# Patient Record
Sex: FEMALE | Race: WHITE | URBAN - METROPOLITAN AREA
[De-identification: names, ages, dates, MRNs, and addresses within clinical notes are randomized per-mention and may not be internally consistent; named-entity substitution may affect disease eponyms.]

---

## 2021-04-07 ENCOUNTER — OFFICE VISIT (OUTPATIENT)
Dept: FAMILY MEDICINE CLINIC | Age: 56
End: 2021-04-07
Payer: COMMERCIAL

## 2021-04-07 VITALS
WEIGHT: 153 LBS | HEIGHT: 65 IN | DIASTOLIC BLOOD PRESSURE: 76 MMHG | SYSTOLIC BLOOD PRESSURE: 118 MMHG | TEMPERATURE: 97.4 F | OXYGEN SATURATION: 98 % | HEART RATE: 83 BPM | BODY MASS INDEX: 25.49 KG/M2

## 2021-04-07 DIAGNOSIS — B35.4 RINGWORM OF BODY: Primary | ICD-10-CM

## 2021-04-07 DIAGNOSIS — L30.9 DERMATITIS: ICD-10-CM

## 2021-04-07 PROCEDURE — 99213 OFFICE O/P EST LOW 20 MIN: CPT | Performed by: PHYSICIAN ASSISTANT

## 2021-04-07 RX ORDER — CLOTRIMAZOLE 1 %
CREAM (GRAM) TOPICAL
Qty: 1 TUBE | Refills: 1 | Status: SHIPPED | OUTPATIENT
Start: 2021-04-07 | End: 2021-04-14

## 2021-04-07 RX ORDER — CEPHALEXIN 500 MG/1
500 CAPSULE ORAL 2 TIMES DAILY
Qty: 14 CAPSULE | Refills: 0 | Status: SHIPPED | OUTPATIENT
Start: 2021-04-07 | End: 2021-04-14

## 2021-04-07 RX ORDER — FLUCONAZOLE 100 MG/1
100 TABLET ORAL DAILY
Qty: 3 TABLET | Refills: 0 | Status: SHIPPED | OUTPATIENT
Start: 2021-04-07 | End: 2021-04-10

## 2021-04-07 SDOH — ECONOMIC STABILITY: TRANSPORTATION INSECURITY
IN THE PAST 12 MONTHS, HAS LACK OF TRANSPORTATION KEPT YOU FROM MEETINGS, WORK, OR FROM GETTING THINGS NEEDED FOR DAILY LIVING?: NO

## 2021-04-07 SDOH — ECONOMIC STABILITY: FOOD INSECURITY: WITHIN THE PAST 12 MONTHS, THE FOOD YOU BOUGHT JUST DIDN'T LAST AND YOU DIDN'T HAVE MONEY TO GET MORE.: NEVER TRUE

## 2021-04-07 SDOH — ECONOMIC STABILITY: FOOD INSECURITY: WITHIN THE PAST 12 MONTHS, YOU WORRIED THAT YOUR FOOD WOULD RUN OUT BEFORE YOU GOT MONEY TO BUY MORE.: NEVER TRUE

## 2021-04-07 ASSESSMENT — ENCOUNTER SYMPTOMS
VOMITING: 0
SINUS PRESSURE: 0
BACK PAIN: 0
SHORTNESS OF BREATH: 0
COUGH: 0
ABDOMINAL PAIN: 0
DIARRHEA: 0
CHEST TIGHTNESS: 0
TROUBLE SWALLOWING: 0

## 2021-04-07 ASSESSMENT — PATIENT HEALTH QUESTIONNAIRE - PHQ9
SUM OF ALL RESPONSES TO PHQ QUESTIONS 1-9: 0
2. FEELING DOWN, DEPRESSED OR HOPELESS: 0
1. LITTLE INTEREST OR PLEASURE IN DOING THINGS: 0
SUM OF ALL RESPONSES TO PHQ QUESTIONS 1-9: 0
SUM OF ALL RESPONSES TO PHQ9 QUESTIONS 1 & 2: 0

## 2021-04-07 NOTE — PROGRESS NOTES
Subjective:      Patient ID: Erick Gamez is a 50 y.o. female who presents today for:  Chief Complaint   Patient presents with    Other     Patient here witha spot on arm started this week        HPI  50year old female who presents with a round spot on her right forearm. She states that she has had it four days. It is a round circular lesion with a clear spot in the center. She cannot remember exactly when she got it. Past Medical History:   Diagnosis Date    Allergic     Hypothyroidism     Sinus disorder      Past Surgical History:   Procedure Laterality Date    CHOLECYSTECTOMY      COLPOSCOPY      DILATION AND CURETTAGE      HYSTEROSCOPY      LEEP      SINUS SURGERY       Social History     Socioeconomic History    Marital status: Single     Spouse name: Not on file    Number of children: Not on file    Years of education: Not on file    Highest education level: Not on file   Occupational History    Not on file   Social Needs    Financial resource strain: Not hard at all    Food insecurity     Worry: Never true     Inability: Never true   [employer] needs     Medical: No     Non-medical: No   Tobacco Use    Smoking status: Never Smoker    Smokeless tobacco: Never Used   Substance and Sexual Activity    Alcohol use:  Yes     Alcohol/week: 2.0 standard drinks     Types: 2 Standard drinks or equivalent per week     Frequency: 2-3 times a week     Drinks per session: 1 or 2     Binge frequency: Never     Comment: occ    Drug use: No    Sexual activity: Not Currently     Partners: Male   Lifestyle    Physical activity     Days per week: Not on file     Minutes per session: Not on file    Stress: Not on file   Relationships    Social connections     Talks on phone: Not on file     Gets together: Not on file     Attends Latter day service: Not on file     Active member of club or organization: Not on file     Attends meetings of clubs or organizations: Not on file     Relationship status: Not on file    Intimate partner violence     Fear of current or ex partner: Not on file     Emotionally abused: Not on file     Physically abused: Not on file     Forced sexual activity: Not on file   Other Topics Concern    Not on file   Social History Narrative    Not on file     Family History   Problem Relation Age of Onset    Cancer Father     Hypertension Mother     Breast Cancer Mother     Breast Cancer Maternal Aunt     Breast Cancer Paternal Grandmother      Allergies   Allergen Reactions    Sulfa Antibiotics Anaphylaxis     Current Outpatient Medications   Medication Sig Dispense Refill    fluconazole (DIFLUCAN) 100 MG tablet Take 1 tablet by mouth daily for 3 days 3 tablet 0    clotrimazole (LOTRIMIN AF) 1 % cream Apply topically 2 times daily. 1 Tube 1    cephALEXin (KEFLEX) 500 MG capsule Take 1 capsule by mouth 2 times daily for 7 days 14 capsule 0    Multiple Vitamin (MULTI VITAMIN DAILY PO) Take by mouth      levothyroxine (SYNTHROID) 125 MCG tablet Take 125 mcg by mouth Daily.  Fexofenadine-Pseudoephedrine (ALLEGRA-D 12 HOUR PO) Take  by mouth.  Fluticasone Propionate (FLONASE NA) by Nasal route.  valACYclovir (VALTREX) 500 MG tablet Take 1 tablet by mouth daily (Patient not taking: Reported on 4/7/2021) 3 tablet 0    Cholecalciferol 2000 UNITS TABS Take by mouth       No current facility-administered medications for this visit. Review of Systems   Constitutional: Negative for activity change, appetite change, chills, fever and unexpected weight change. HENT: Negative for drooling, ear pain, nosebleeds, sinus pressure and trouble swallowing. Respiratory: Negative for cough, chest tightness and shortness of breath. Cardiovascular: Negative for chest pain and leg swelling. Gastrointestinal: Negative for abdominal pain, diarrhea and vomiting. Endocrine: Negative for polydipsia and polyphagia.    Genitourinary: Negative for dysuria, flank pain and frequency. Musculoskeletal: Negative for back pain and myalgias. Skin: Positive for rash (ring worm of the right forearm). Negative for pallor. Neurological: Negative for syncope, weakness and headaches. Hematological: Does not bruise/bleed easily. Psychiatric/Behavioral: Negative for agitation, behavioral problems and confusion. All other systems reviewed and are negative. Objective:   /76 (Site: Right Upper Arm, Position: Sitting, Cuff Size: Large Adult)   Pulse 83   Temp 97.4 °F (36.3 °C) (Temporal)   Ht 5' 5\" (1.651 m)   Wt 153 lb (69.4 kg)   LMP 06/15/2014   SpO2 98%   BMI 25.46 kg/m²     Physical Exam  Vitals signs and nursing note reviewed. Constitutional:       General: She is awake. She is not in acute distress. Appearance: Normal appearance. She is well-developed and normal weight. She is not ill-appearing, toxic-appearing or diaphoretic. Comments: No photophobia. No phonophobia. HENT:      Head: Normocephalic and atraumatic. No Albert's sign. Right Ear: External ear normal.      Left Ear: External ear normal.      Nose: Nose normal. No congestion or rhinorrhea. Mouth/Throat:      Mouth: Mucous membranes are moist.      Pharynx: Oropharynx is clear. No oropharyngeal exudate or posterior oropharyngeal erythema. Eyes:      General: No scleral icterus. Right eye: No foreign body or discharge. Left eye: No discharge. Extraocular Movements: Extraocular movements intact. Conjunctiva/sclera: Conjunctivae normal.      Left eye: No exudate. Pupils: Pupils are equal, round, and reactive to light. Neck:      Musculoskeletal: Normal range of motion and neck supple. No neck rigidity. Vascular: No JVD. Trachea: No tracheal deviation. Comments: No meningismus. Cardiovascular:      Rate and Rhythm: Normal rate and regular rhythm. Heart sounds: Normal heart sounds. Heart sounds not distant. No murmur.  No friction rub. No gallop. Pulmonary:      Effort: Pulmonary effort is normal. No respiratory distress. Breath sounds: Normal breath sounds. No stridor. No wheezing, rhonchi or rales. Chest:      Chest wall: No tenderness. Abdominal:      General: Abdomen is flat. Bowel sounds are normal. There is no distension. Palpations: Abdomen is soft. There is no mass. Tenderness: There is no abdominal tenderness. There is no right CVA tenderness, left CVA tenderness, guarding or rebound. Hernia: No hernia is present. Musculoskeletal: Normal range of motion. General: No swelling, tenderness, deformity or signs of injury. Lymphadenopathy:      Head:      Right side of head: No submental adenopathy. Left side of head: No submental adenopathy. Skin:     General: Skin is warm and dry. Capillary Refill: Capillary refill takes less than 2 seconds. Coloration: Skin is not jaundiced or pale. Findings: No bruising, erythema, lesion or rash. Neurological:      General: No focal deficit present. Mental Status: She is alert and oriented to person, place, and time. Mental status is at baseline. Cranial Nerves: No cranial nerve deficit. Sensory: No sensory deficit. Motor: No weakness. Coordination: Coordination normal.      Deep Tendon Reflexes: Reflexes are normal and symmetric. Psychiatric:         Mood and Affect: Mood normal.         Behavior: Behavior normal. Behavior is cooperative. Thought Content: Thought content normal.         Judgment: Judgment normal.         Assessment:       Diagnosis Orders   1. Ringworm of body  fluconazole (DIFLUCAN) 100 MG tablet    clotrimazole (LOTRIMIN AF) 1 % cream    cephALEXin (KEFLEX) 500 MG capsule   2. Dermatitis  fluconazole (DIFLUCAN) 100 MG tablet    clotrimazole (LOTRIMIN AF) 1 % cream    cephALEXin (KEFLEX) 500 MG capsule     No results found for this visit on 04/07/21.    Plan:     Assessment & Plan Mark Hayes was seen today for other. Diagnoses and all orders for this visit:    Ringworm of body  -     fluconazole (DIFLUCAN) 100 MG tablet; Take 1 tablet by mouth daily for 3 days  -     clotrimazole (LOTRIMIN AF) 1 % cream; Apply topically 2 times daily. -     cephALEXin (KEFLEX) 500 MG capsule; Take 1 capsule by mouth 2 times daily for 7 days    Dermatitis  -     fluconazole (DIFLUCAN) 100 MG tablet; Take 1 tablet by mouth daily for 3 days  -     clotrimazole (LOTRIMIN AF) 1 % cream; Apply topically 2 times daily. -     cephALEXin (KEFLEX) 500 MG capsule; Take 1 capsule by mouth 2 times daily for 7 days      No orders of the defined types were placed in this encounter. Orders Placed This Encounter   Medications    fluconazole (DIFLUCAN) 100 MG tablet     Sig: Take 1 tablet by mouth daily for 3 days     Dispense:  3 tablet     Refill:  0    clotrimazole (LOTRIMIN AF) 1 % cream     Sig: Apply topically 2 times daily. Dispense:  1 Tube     Refill:  1    cephALEXin (KEFLEX) 500 MG capsule     Sig: Take 1 capsule by mouth 2 times daily for 7 days     Dispense:  14 capsule     Refill:  0     Medications Discontinued During This Encounter   Medication Reason    diphenhydrAMINE (BENADRYL) 50 MG tablet LIST CLEANUP     Return if symptoms worsen or fail to improve. Reviewed with the patient/family: current clinical status & medications. Side effects of the medication prescribed today, as well as treatment plan/rationale and result expectations have been discussed with the patient/family who expresses understanding. Patient will be discharged home in stable condition. Follow up with PCP to evaluate treatment results or return if symptoms worsen or fail to improve. Discussed signs and symptoms which require immediate follow-up in ED/call to 911. Understanding verbalized. I have reviewed the patient's medical history in detail and updated the computerized patient record.     Mitesh Garcia PA

## 2021-04-07 NOTE — PATIENT INSTRUCTIONS
Patient Education        Dermatitis: Care Instructions  Your Care Instructions  Dermatitis is the general name used for any rash or inflammation of the skin. Different kinds of dermatitis cause different kinds of rashes. Common causes of a rash include new medicines, plants (such as poison oak or poison ivy), heat, and stress. Certain illnesses can also cause a rash. An allergic reaction to something that touches your skin, such as latex, nickel, or poison ivy, is called contact dermatitis. Contact dermatitis may also be caused by something that irritates the skin, such as bleach, a chemical, or soap. These types of rashes cannot be spread from person to person. How long your rash will last depends on what caused it. Rashes may last a few days or months. Follow-up care is a key part of your treatment and safety. Be sure to make and go to all appointments, and call your doctor if you are having problems. It's also a good idea to know your test results and keep a list of the medicines you take. How can you care for yourself at home? · Do not scratch the rash. Cut your nails short, and file them smooth. Or wear gloves if this helps keep you from scratching. · Wash the area with water only. Pat dry. · Put cold, wet cloths on the rash to reduce itching. · Keep cool, and stay out of the sun. · Leave the rash open to the air as much as possible. · If the rash itches, use hydrocortisone cream. Follow the directions on the label. Calamine lotion may help for plant rashes. · Take an over-the-counter antihistamine, such as diphenhydramine (Benadryl) or loratadine (Claritin), to help calm the itching. Read and follow all instructions on the label. · If your doctor prescribed a cream, use it as directed. If your doctor prescribed medicine, take it exactly as directed. When should you call for help?    Call your doctor now or seek immediate medical care if:    · You have symptoms of infection, such as:  ? Increased pain, swelling, warmth, or redness. ? Red streaks leading from the area. ? Pus draining from the area. ? A fever.     · You have joint pain along with the rash. Watch closely for changes in your health, and be sure to contact your doctor if:    · Your rash is changing or getting worse.     · You are not getting better as expected. Where can you learn more? Go to https://SlideMail.[employer]. org and sign in to your [employer] account. Enter [phone] in the [facility] box to learn more about \"Dermatitis: Care Instructions. \"     If you do not have an account, please click on the \"Sign Up Now\" link. Current as of: July 2, 2020               Content Version: 12.8  © 5369-3155 Healthwise, Incorporated. Care instructions adapted under license by [facility] ([facility]). If you have questions about a medical condition or this instruction, always ask your healthcare professional. [place] [address] any warranty or liability for your use of this information.

## 2021-09-02 ENCOUNTER — OFFICE VISIT (OUTPATIENT)
Dept: FAMILY MEDICINE CLINIC | Age: 56
End: 2021-09-02
Payer: COMMERCIAL

## 2021-09-02 VITALS
SYSTOLIC BLOOD PRESSURE: 120 MMHG | OXYGEN SATURATION: 96 % | HEART RATE: 86 BPM | TEMPERATURE: 97.4 F | DIASTOLIC BLOOD PRESSURE: 80 MMHG | WEIGHT: 153 LBS | HEIGHT: 65 IN | BODY MASS INDEX: 25.49 KG/M2

## 2021-09-02 DIAGNOSIS — B88.0 CHIGGER BITE: Primary | ICD-10-CM

## 2021-09-02 DIAGNOSIS — L30.9 DERMATITIS: ICD-10-CM

## 2021-09-02 PROCEDURE — 99213 OFFICE O/P EST LOW 20 MIN: CPT | Performed by: PHYSICIAN ASSISTANT

## 2021-09-02 RX ORDER — DOXYCYCLINE HYCLATE 100 MG
100 TABLET ORAL 2 TIMES DAILY
Qty: 20 TABLET | Refills: 0 | Status: SHIPPED | OUTPATIENT
Start: 2021-09-02 | End: 2021-09-12

## 2021-09-02 RX ORDER — METHYLPREDNISOLONE 4 MG/1
TABLET ORAL
Qty: 1 KIT | Refills: 0 | Status: SHIPPED | OUTPATIENT
Start: 2021-09-02 | End: 2021-09-08

## 2021-09-02 RX ORDER — CLOBETASOL PROPIONATE 0.5 MG/G
OINTMENT TOPICAL
Qty: 30 G | Refills: 1 | Status: SHIPPED | OUTPATIENT
Start: 2021-09-02

## 2021-09-02 ASSESSMENT — ENCOUNTER SYMPTOMS
TROUBLE SWALLOWING: 0
ABDOMINAL PAIN: 0
COUGH: 0
SHORTNESS OF BREATH: 0
DIARRHEA: 0
SINUS PRESSURE: 0
CHEST TIGHTNESS: 0
BACK PAIN: 0
VOMITING: 0

## 2021-09-02 ASSESSMENT — PATIENT HEALTH QUESTIONNAIRE - PHQ9
SUM OF ALL RESPONSES TO PHQ9 QUESTIONS 1 & 2: 0
SUM OF ALL RESPONSES TO PHQ QUESTIONS 1-9: 0
SUM OF ALL RESPONSES TO PHQ QUESTIONS 1-9: 0
2. FEELING DOWN, DEPRESSED OR HOPELESS: 0
SUM OF ALL RESPONSES TO PHQ QUESTIONS 1-9: 0
1. LITTLE INTEREST OR PLEASURE IN DOING THINGS: 0

## 2021-09-02 NOTE — PROGRESS NOTES
with Friends and Family:     Frequency of Social Gatherings with Friends and Family:     Attends [employer] Services:     Active Member of Clubs or Organizations:     Attends Club or Organization Meetings:     Marital Status:    Intimate Partner Violence:     Fear of Current or Ex-Partner:     Emotionally Abused:     Physically Abused:     Sexually Abused:      Family History   Problem Relation Age of Onset    Cancer Father     Hypertension Mother     Breast Cancer Mother     Breast Cancer Maternal Aunt     Breast Cancer Paternal Grandmother      Allergies   Allergen Reactions    Sulfa Antibiotics Anaphylaxis     Current Outpatient Medications   Medication Sig Dispense Refill    doxycycline hyclate (VIBRA-TABS) 100 MG tablet Take 1 tablet by mouth 2 times daily for 10 days 20 tablet 0    methylPREDNISolone (MEDROL DOSEPACK) 4 MG tablet Take by mouth. 1 kit 0    clobetasol (TEMOVATE) 0.05 % ointment Apply topically 2 times daily. 30 g 1    Cholecalciferol 2000 UNITS TABS Take by mouth      Multiple Vitamin (MULTI VITAMIN DAILY PO) Take by mouth      levothyroxine (SYNTHROID) 125 MCG tablet Take 125 mcg by mouth Daily.  Fexofenadine-Pseudoephedrine (ALLEGRA-D 12 HOUR PO) Take  by mouth.  Fluticasone Propionate (FLONASE NA) by Nasal route.  valACYclovir (VALTREX) 500 MG tablet Take 1 tablet by mouth daily (Patient not taking: Reported on 4/7/2021) 3 tablet 0     No current facility-administered medications for this visit. Review of Systems   Constitutional: Negative for activity change, appetite change, chills, fever and unexpected weight change. HENT: Negative for drooling, ear pain, nosebleeds, sinus pressure and trouble swallowing. Respiratory: Negative for cough, chest tightness and shortness of breath. Cardiovascular: Negative for chest pain and leg swelling. Gastrointestinal: Negative for abdominal pain, diarrhea and vomiting.    Endocrine: Negative for polydipsia and polyphagia. Genitourinary: Negative for dysuria, flank pain and frequency. Musculoskeletal: Negative for back pain and myalgias. Skin: Positive for rash (bites from chiggers). Negative for pallor. Neurological: Negative for syncope, weakness and headaches. Hematological: Does not bruise/bleed easily. Psychiatric/Behavioral: Negative for agitation, behavioral problems and confusion. All other systems reviewed and are negative. Objective:   /80 (Site: Left Upper Arm, Position: Sitting, Cuff Size: Small Adult)   Pulse 86   Temp 97.4 °F (36.3 °C)   Ht 5' 5\" (1.651 m)   Wt 153 lb (69.4 kg)   LMP 06/15/2014   SpO2 96%   BMI 25.46 kg/m²     Physical Exam  Vitals and nursing note reviewed. Constitutional:       General: She is awake. She is not in acute distress. Appearance: Normal appearance. She is well-developed and normal weight. She is not ill-appearing, toxic-appearing or diaphoretic. Comments: No photophobia. No phonophobia. HENT:      Head: Normocephalic and atraumatic. No Albert's sign. Right Ear: External ear normal.      Left Ear: External ear normal.      Nose: Nose normal. No congestion or rhinorrhea. Mouth/Throat:      Mouth: Mucous membranes are moist.      Pharynx: Oropharynx is clear. No oropharyngeal exudate or posterior oropharyngeal erythema. Eyes:      General: No scleral icterus. Right eye: No foreign body or discharge. Left eye: No discharge. Extraocular Movements: Extraocular movements intact. Conjunctiva/sclera: Conjunctivae normal.      Left eye: No exudate. Pupils: Pupils are equal, round, and reactive to light. Neck:      Vascular: No JVD. Trachea: No tracheal deviation. Comments: No meningismus. Cardiovascular:      Rate and Rhythm: Normal rate and regular rhythm. Heart sounds: Normal heart sounds. Heart sounds not distant. No murmur heard. No friction rub. No gallop. Pulmonary:      Effort: Pulmonary effort is normal. No respiratory distress. Breath sounds: Normal breath sounds. No stridor. No wheezing, rhonchi or rales. Chest:      Chest wall: No tenderness. Abdominal:      General: Abdomen is flat. Bowel sounds are normal. There is no distension. Palpations: Abdomen is soft. There is no mass. Tenderness: There is no abdominal tenderness. There is no right CVA tenderness, left CVA tenderness, guarding or rebound. Hernia: No hernia is present. Musculoskeletal:         General: No swelling, tenderness, deformity or signs of injury. Normal range of motion. Cervical back: Normal range of motion and neck supple. No rigidity. Lymphadenopathy:      Head:      Right side of head: No submental adenopathy. Left side of head: No submental adenopathy. Skin:     General: Skin is warm and dry. Capillary Refill: Capillary refill takes less than 2 seconds. Coloration: Skin is not jaundiced or pale. Findings: Rash present. No bruising, erythema or lesion. Rash is macular. Neurological:      General: No focal deficit present. Mental Status: She is alert and oriented to person, place, and time. Mental status is at baseline. Cranial Nerves: No cranial nerve deficit. Sensory: No sensory deficit. Motor: No weakness. Coordination: Coordination normal.      Deep Tendon Reflexes: Reflexes are normal and symmetric. Psychiatric:         Mood and Affect: Mood normal.         Behavior: Behavior normal. Behavior is cooperative. Thought Content: Thought content normal.         Judgment: Judgment normal.         Assessment:       Diagnosis Orders   1. Chigger bite  doxycycline hyclate (VIBRA-TABS) 100 MG tablet    clobetasol (TEMOVATE) 0.05 % ointment   2.  Dermatitis  doxycycline hyclate (VIBRA-TABS) 100 MG tablet    methylPREDNISolone (MEDROL DOSEPACK) 4 MG tablet    clobetasol (TEMOVATE) 0.05 % ointment     No results found for this visit on 09/02/21. Plan:     Assessment & Plan   Sherman Pallas was seen today for insect bite. Diagnoses and all orders for this visit:    Chigger bite  -     doxycycline hyclate (VIBRA-TABS) 100 MG tablet; Take 1 tablet by mouth 2 times daily for 10 days  -     clobetasol (TEMOVATE) 0.05 % ointment; Apply topically 2 times daily. Dermatitis  -     doxycycline hyclate (VIBRA-TABS) 100 MG tablet; Take 1 tablet by mouth 2 times daily for 10 days  -     methylPREDNISolone (MEDROL DOSEPACK) 4 MG tablet; Take by mouth. -     clobetasol (TEMOVATE) 0.05 % ointment; Apply topically 2 times daily. No orders of the defined types were placed in this encounter. Orders Placed This Encounter   Medications    doxycycline hyclate (VIBRA-TABS) 100 MG tablet     Sig: Take 1 tablet by mouth 2 times daily for 10 days     Dispense:  20 tablet     Refill:  0    methylPREDNISolone (MEDROL DOSEPACK) 4 MG tablet     Sig: Take by mouth. Dispense:  1 kit     Refill:  0    clobetasol (TEMOVATE) 0.05 % ointment     Sig: Apply topically 2 times daily. Dispense:  30 g     Refill:  1     There are no discontinued medications. Return if symptoms worsen or fail to improve. Reviewed with the patient/family: current clinical status & medications. Side effects of the medication prescribed today, as well as treatment plan/rationale and result expectations have been discussed with the patient/family who expresses understanding. Patient will be discharged home in stable condition. Follow up with PCP to evaluate treatment results or return if symptoms worsen or fail to improve. Discussed signs and symptoms which require immediate follow-up in ED/call to 911. Understanding verbalized. I have reviewed the patient's medical history in detail and updated the computerized patient record.     TIO Mahajan

## 2021-09-02 NOTE — PATIENT INSTRUCTIONS
Patient Education        Dermatitis: Care Instructions  Your Care Instructions  Dermatitis is the general name used for any rash or inflammation of the skin. Different kinds of dermatitis cause different kinds of rashes. Common causes of a rash include new medicines, plants (such as poison oak or poison ivy), heat, and stress. Certain illnesses can also cause a rash. An allergic reaction to something that touches your skin, such as latex, nickel, or poison ivy, is called contact dermatitis. Contact dermatitis may also be caused by something that irritates the skin, such as bleach, a chemical, or soap. These types of rashes cannot be spread from person to person. How long your rash will last depends on what caused it. Rashes may last a few days or months. Follow-up care is a key part of your treatment and safety. Be sure to make and go to all appointments, and call your doctor if you are having problems. It's also a good idea to know your test results and keep a list of the medicines you take. How can you care for yourself at home? · Do not scratch the rash. Cut your nails short, and file them smooth. Or wear gloves if this helps keep you from scratching. · Wash the area with water only. Pat dry. · Put cold, wet cloths on the rash to reduce itching. · Keep cool, and stay out of the sun. · Leave the rash open to the air as much as possible. · If the rash itches, use hydrocortisone cream. Follow the directions on the label. Calamine lotion may help for plant rashes. · Take an over-the-counter antihistamine, such as diphenhydramine (Benadryl) or loratadine (Claritin), to help calm the itching. Read and follow all instructions on the label. · If your doctor prescribed a cream, use it as directed. If your doctor prescribed medicine, take it exactly as directed. When should you call for help?    Call your doctor now or seek immediate medical care if:    · You have symptoms of infection, such as:  ? Increased pain, swelling, warmth, or redness. ? Red streaks leading from the area. ? Pus draining from the area. ? A fever.     · You have joint pain along with the rash. Watch closely for changes in your health, and be sure to contact your doctor if:    · Your rash is changing or getting worse.     · You are not getting better as expected. Where can you learn more? Go to https://[employer]. org and sign in to your [employer] account. Enter [phone] in the [facility] box to learn more about \"Dermatitis: Care Instructions. \"     If you do not have an account, please click on the \"Sign Up Now\" link. Current as of: March 3, 2021               Content Version: 12.9  © 3018-1529 Healthwise, Incorporated. Care instructions adapted under license by [facility] ([facility]). If you have questions about a medical condition or this instruction, always ask your healthcare professional. [place] [address] any warranty or liability for your use of this information.

## 2023-05-09 ENCOUNTER — TELEPHONE (OUTPATIENT)
Dept: PRIMARY CARE | Facility: CLINIC | Age: 58
End: 2023-05-09
Payer: COMMERCIAL

## 2023-05-09 NOTE — TELEPHONE ENCOUNTER
Pt is asking if you can put in her lab orders. She is asking for a CMP, Vitamin B, liver, and kidneys. Her appointment is on 6-29-23.

## 2023-06-22 ENCOUNTER — TELEPHONE (OUTPATIENT)
Dept: PRIMARY CARE | Facility: CLINIC | Age: 58
End: 2023-06-22

## 2023-06-22 ENCOUNTER — LAB (OUTPATIENT)
Dept: LAB | Facility: LAB | Age: 58
End: 2023-06-22
Payer: COMMERCIAL

## 2023-06-22 DIAGNOSIS — R53.83 FATIGUE, UNSPECIFIED TYPE: ICD-10-CM

## 2023-06-22 DIAGNOSIS — E78.5 HYPERLIPIDEMIA, UNSPECIFIED HYPERLIPIDEMIA TYPE: Primary | ICD-10-CM

## 2023-06-22 DIAGNOSIS — E55.9 VITAMIN D DEFICIENCY: ICD-10-CM

## 2023-06-22 PROBLEM — R42 DIZZINESS: Status: ACTIVE | Noted: 2023-06-22

## 2023-06-22 PROBLEM — M25.369 PATELLAR INSTABILITY: Status: ACTIVE | Noted: 2023-06-22

## 2023-06-22 PROBLEM — M22.2X9 PFS (PATELLOFEMORAL SYNDROME): Status: ACTIVE | Noted: 2023-06-22

## 2023-06-22 PROBLEM — M25.569 KNEE PAIN: Status: ACTIVE | Noted: 2023-06-22

## 2023-06-22 PROBLEM — S90.00XA ANKLE CONTUSION: Status: ACTIVE | Noted: 2023-06-22

## 2023-06-22 PROBLEM — R19.5 INCREASED MUCUS IN STOOL: Status: ACTIVE | Noted: 2023-06-22

## 2023-06-22 PROBLEM — E03.9 HYPOTHYROIDISM: Status: ACTIVE | Noted: 2023-06-22

## 2023-06-22 PROBLEM — M25.579 ANKLE PAIN: Status: ACTIVE | Noted: 2023-06-22

## 2023-06-22 PROBLEM — G47.34 OXYGEN DESATURATION DURING SLEEP: Status: ACTIVE | Noted: 2023-06-22

## 2023-06-22 PROBLEM — K64.4 EXTERNAL HEMORRHOID: Status: ACTIVE | Noted: 2023-06-22

## 2023-06-22 PROBLEM — M79.18 MYOFASCIAL PAIN SYNDROME OF THORACIC SPINE: Status: ACTIVE | Noted: 2023-06-22

## 2023-06-22 PROBLEM — E78.2 HYPERLIPIDEMIA, MIXED: Status: ACTIVE | Noted: 2023-06-22

## 2023-06-22 PROBLEM — S93.409A ANKLE SPRAIN: Status: ACTIVE | Noted: 2023-06-22

## 2023-06-22 PROBLEM — E04.2 MULTIPLE THYROID NODULES: Status: ACTIVE | Noted: 2023-06-22

## 2023-06-22 PROBLEM — M62.81 MUSCLE WEAKNESS: Status: ACTIVE | Noted: 2023-06-22

## 2023-06-22 PROBLEM — G47.33 OBSTRUCTIVE SLEEP APNEA: Status: ACTIVE | Noted: 2023-06-22

## 2023-06-22 PROBLEM — J30.9 ALLERGIC RHINITIS: Status: ACTIVE | Noted: 2023-06-22

## 2023-06-22 PROBLEM — R19.7 DIARRHEA: Status: ACTIVE | Noted: 2023-06-22

## 2023-06-22 PROBLEM — M99.09 SEGMENTAL AND SOMATIC DYSFUNCTION: Status: ACTIVE | Noted: 2023-06-22

## 2023-06-22 PROBLEM — S00.33XA CONTUSION OF NOSE: Status: ACTIVE | Noted: 2023-06-22

## 2023-06-22 PROBLEM — R19.5 STOOL COLOR ABNORMAL: Status: ACTIVE | Noted: 2023-06-22

## 2023-06-22 PROBLEM — J31.0 CHRONIC RHINITIS: Status: ACTIVE | Noted: 2023-06-22

## 2023-06-22 PROBLEM — U07.1 COVID-19 VIRUS INFECTION: Status: ACTIVE | Noted: 2023-06-22
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
## 2023-06-22 PROBLEM — R06.83 SNORING: Status: ACTIVE | Noted: 2023-06-22

## 2023-06-22 PROBLEM — M54.6 THORACIC BACK PAIN: Status: ACTIVE | Noted: 2023-06-22

## 2023-06-22 PROBLEM — S80.01XA HEMATOMA OF RIGHT KNEE REGION: Status: ACTIVE | Noted: 2023-06-22

## 2023-06-22 LAB
ALANINE AMINOTRANSFERASE (SGPT) (U/L) IN SER/PLAS: 18 U/L (ref 7–45)
ALBUMIN (G/DL) IN SER/PLAS: 4.5 G/DL (ref 3.4–5)
ALKALINE PHOSPHATASE (U/L) IN SER/PLAS: 55 U/L (ref 33–110)
ANION GAP IN SER/PLAS: 13 MMOL/L (ref 10–20)
ASPARTATE AMINOTRANSFERASE (SGOT) (U/L) IN SER/PLAS: 22 U/L (ref 9–39)
BILIRUBIN TOTAL (MG/DL) IN SER/PLAS: 0.8 MG/DL (ref 0–1.2)
CALCIDIOL (25 OH VITAMIN D3) (NG/ML) IN SER/PLAS: 27 NG/ML
CALCIUM (MG/DL) IN SER/PLAS: 9.9 MG/DL (ref 8.6–10.3)
CARBON DIOXIDE, TOTAL (MMOL/L) IN SER/PLAS: 28 MMOL/L (ref 21–32)
CHLORIDE (MMOL/L) IN SER/PLAS: 102 MMOL/L (ref 98–107)
CREATININE (MG/DL) IN SER/PLAS: 0.78 MG/DL (ref 0.5–1.05)
GFR FEMALE: 89 ML/MIN/1.73M2
GLUCOSE (MG/DL) IN SER/PLAS: 89 MG/DL (ref 74–99)
POTASSIUM (MMOL/L) IN SER/PLAS: 4.5 MMOL/L (ref 3.5–5.3)
PROTEIN TOTAL: 7.7 G/DL (ref 6.4–8.2)
SODIUM (MMOL/L) IN SER/PLAS: 138 MMOL/L (ref 136–145)
UREA NITROGEN (MG/DL) IN SER/PLAS: 10 MG/DL (ref 6–23)

## 2023-06-22 PROCEDURE — 80053 COMPREHEN METABOLIC PANEL: CPT

## 2023-06-22 PROCEDURE — 36415 COLL VENOUS BLD VENIPUNCTURE: CPT

## 2023-06-22 PROCEDURE — 82306 VITAMIN D 25 HYDROXY: CPT

## 2023-06-22 RX ORDER — LEVOTHYROXINE SODIUM 125 UG/1
TABLET ORAL
COMMUNITY
Start: 2015-01-06

## 2023-06-22 RX ORDER — HYDROCORTISONE ACETATE PRAMOXINE HCL 2.5; 1 G/100G; G/100G
CREAM TOPICAL
COMMUNITY
Start: 2023-01-20 | End: 2023-06-29 | Stop reason: ALTCHOICE

## 2023-06-22 RX ORDER — CYCLOBENZAPRINE HCL 10 MG
TABLET ORAL
COMMUNITY
Start: 2022-10-26 | End: 2023-06-29 | Stop reason: ALTCHOICE

## 2023-06-22 RX ORDER — FEXOFENADINE HCL AND PSEUDOEPHEDRINE HCI 60; 120 MG/1; MG/1
1 TABLET, EXTENDED RELEASE ORAL EVERY 12 HOURS
COMMUNITY
Start: 2015-03-23

## 2023-06-22 NOTE — TELEPHONE ENCOUNTER
Pt called and stated that she had her blood work done this morning and was informed that only a CMP and Vitamin D order was placed, Pt thought she was getting her lipid panel done as well, can an order be placed for the lipid panel for the Pt?

## 2023-06-29 ENCOUNTER — LAB (OUTPATIENT)
Dept: LAB | Facility: LAB | Age: 58
End: 2023-06-29
Payer: COMMERCIAL

## 2023-06-29 ENCOUNTER — OFFICE VISIT (OUTPATIENT)
Dept: PRIMARY CARE | Facility: CLINIC | Age: 58
End: 2023-06-29
Payer: COMMERCIAL

## 2023-06-29 VITALS
WEIGHT: 151 LBS | HEIGHT: 65 IN | HEART RATE: 71 BPM | SYSTOLIC BLOOD PRESSURE: 118 MMHG | DIASTOLIC BLOOD PRESSURE: 78 MMHG | BODY MASS INDEX: 25.16 KG/M2 | OXYGEN SATURATION: 100 %

## 2023-06-29 DIAGNOSIS — E78.2 HYPERLIPIDEMIA, MIXED: ICD-10-CM

## 2023-06-29 DIAGNOSIS — Z00.00 HEALTH MAINTENANCE EXAMINATION: Primary | ICD-10-CM

## 2023-06-29 DIAGNOSIS — E78.5 HYPERLIPIDEMIA, UNSPECIFIED HYPERLIPIDEMIA TYPE: ICD-10-CM

## 2023-06-29 DIAGNOSIS — R53.83 FATIGUE, UNSPECIFIED TYPE: ICD-10-CM

## 2023-06-29 DIAGNOSIS — E03.9 HYPOTHYROIDISM, UNSPECIFIED TYPE: ICD-10-CM

## 2023-06-29 DIAGNOSIS — E55.9 VITAMIN D DEFICIENCY: ICD-10-CM

## 2023-06-29 LAB
CHOLESTEROL (MG/DL) IN SER/PLAS: 256 MG/DL (ref 0–199)
CHOLESTEROL IN HDL (MG/DL) IN SER/PLAS: 64.8 MG/DL
CHOLESTEROL/HDL RATIO: 4
LDL: 157 MG/DL (ref 0–99)
TRIGLYCERIDE (MG/DL) IN SER/PLAS: 172 MG/DL (ref 0–149)
VLDL: 34 MG/DL (ref 0–40)

## 2023-06-29 PROCEDURE — 36415 COLL VENOUS BLD VENIPUNCTURE: CPT

## 2023-06-29 PROCEDURE — 99396 PREV VISIT EST AGE 40-64: CPT | Performed by: FAMILY MEDICINE

## 2023-06-29 PROCEDURE — 1036F TOBACCO NON-USER: CPT | Performed by: FAMILY MEDICINE

## 2023-06-29 PROCEDURE — 80061 LIPID PANEL: CPT

## 2023-06-29 PROCEDURE — 93000 ELECTROCARDIOGRAM COMPLETE: CPT | Performed by: FAMILY MEDICINE

## 2023-06-29 RX ORDER — FLUTICASONE PROPIONATE 50 MCG
SPRAY, SUSPENSION (ML) NASAL
COMMUNITY

## 2023-06-29 NOTE — PROGRESS NOTES
Annual Comprehensive Medical Exam    58 y.o. female presents for annual comprehensive medical evaluation and preventive services screening.  No recent hospitalizations, surgeries or significant injuries.    HPI  Patient is dealing with realization that her mother has narcissistic tendencies and patient is learning to cope.  She believes it is affecting her overall health but has no specific issues other than occasional abnormal sensation in the epigastric area.    Hypothyroidism is managed by endocrinology at [facility].    History of hyperlipidemia.  Patient continues trying to lower her LDL numbers with diet and exercise.  She has been somewhat successful.  A lipid panel is pending.    Maintaining an ideal body weight with diet and exercise.    Gynecologic care is provided by Dr. Karrie Steinberg.  Appointment is coming up next month.    History of nonalcoholic fatty liver disease.  Liver function tests have been normal.  Patient has a history of benign hepatic tumor requiring excision remotely.    Colon cancer screening in 2019 with colonoscopy.    One night in-home polysomnogram in January 2023 did not indicate active sleep apnea.  In lab polysomnogram was recommended if there is strong clinical suspicion for NO      Past Medical History:   Diagnosis Date    COVID-19 06/01/2022    COVID-19 virus infection    Other specified enthesopathies of right lower limb, excluding foot 07/17/2017    Tendinitis of right hip flexor    Personal history of other diseases of the digestive system 09/22/2018    History of hemorrhoids    Personal history of other infectious and parasitic diseases     History of herpes labialis      Past Surgical History:   Procedure Laterality Date    OTHER SURGICAL HISTORY  09/04/2019    Liver lobectomy    OTHER SURGICAL HISTORY  06/01/2022    Cholecystectomy     Family History   Problem Relation Name Age of Onset    Hypertension Mother      Lung cancer Father        Social History     Socioeconomic History  "   Marital status: Single     Spouse name: Not on file    Number of children: Not on file    Years of education: Not on file    Highest education level: Not on file   Occupational History    Not on file   Tobacco Use    Smoking status: Never     Passive exposure: Never    Smokeless tobacco: Never   Substance and Sexual Activity    Alcohol use: Not on file    Drug use: Not on file    Sexual activity: Not on file   Other Topics Concern    Not on file   Social History Narrative    Not on file     Social Determinants of Health     Financial Resource Strain: Not on file   Food Insecurity: Not on file   Transportation Needs: Not on file   Physical Activity: Not on file   Stress: Not on file   Social Connections: Not on file   Intimate Partner Violence: Not on file   Housing Stability: Not on file       Current Outpatient Medications on File Prior to Visit   Medication Sig Dispense Refill    fexofenadine-pseudoephedrine (Allegra-D)  mg 12 hr tablet Take 1 tablet by mouth every 12 hours.      levothyroxine (Synthroid, Levoxyl) 125 mcg tablet Take by mouth.      fluticasone (Flonase) 50 mcg/actuation nasal spray Administer into affected nostril(s).      [DISCONTINUED] cyclobenzaprine (Flexeril) 10 mg tablet Take by mouth.      [DISCONTINUED] hydrocortisone-pramoxine (Analpram-HC) 2.5-1 % cream Apply topically.       No current facility-administered medications on file prior to visit.       Allergies   Allergen Reactions    Sulfa (Sulfonamide Antibiotics) Anaphylaxis and Swelling       Complete review of systems is negative today except for that mentioned in the history of present illness.  In particular patient denies chest pain, shortness of breath, headaches and GI disturbances.      Visit Vitals  /78   Pulse 71   Ht 1.651 m (5' 5\")   Wt 68.5 kg (151 lb)   SpO2 100%   BMI 25.13 kg/m²   Smoking Status Never   BSA 1.77 m²      Physical Exam  Gen.: Alert and oriented ×3 female in no acute distress.  HEENT: Head is " normocephalic.  Pupils equal and reactive to light.  Tympanic membranes are clear.  Pharynx is clear.  Neck is supple without adenopathy or carotid bruits.  No masses or thyromegaly  Heart: Regular rate and rhythm without murmurs.  Lungs: Clear to auscultation bilaterally.  Breasts: deferred to GYN at pt request.  Pelvic: Deferred to GYN at pt request.  Abdomen: Soft with normal bowel sounds.  No masses or pain to palpation.  No bruits auscultated.  Extremities: Good range of motion of all joints.  No significant edema. Pedal pulses +1-2/4  Skin: No significant or irregular nevi visualized.  Neuro: No signs of focal neurologic deficit.  No tremor.  Speech and hearing are normal.  DTRs +3/4;  Muscle Strength +5/5.  Musculoskeletal: Spine with good ROM.  No scoliosis.  Leg lengths are equal.  Psych: normal affect.  No suicidal ideation.  Good judgement and insight.       Diagnosis/Plan  1. Health maintenance examination  - Comprehensive Metabolic Panel; Future  - Vitamin D, Total; Future    2. Hyperlipidemia, mixed  - ECG 12 lead (Clinic Performed)  - Lipid Panel; Future    3. Hypothyroidism, unspecified type  Follow up with Endocrinologist at [facility]  - ECG 12 lead (Clinic Performed)    4. Vitamin D deficiency  - Vitamin D, Total; Future    5. 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    Encouraged to sign up with  [employer]    Contact office or send a  [employer] message with any questions or concerns    Patient will only be notified of labs that require medical  intervention.    Prescriptions will not be filled unless you are compliant with your follow up appointments or have a follow up appointment scheduled as per instruction of your physician. Refills should be requested at the time of your visit.    **Charting was completed using voice recognition technology and may include unintended errors**    Keith Dhaliwal DO, FACOFP  Senior Attending Physician  [facility]  [address]  [place] [id number] [phone]           Keith Dhaliwal DO, FACOFP

## 2023-08-23 ENCOUNTER — APPOINTMENT (OUTPATIENT)
Dept: PRIMARY CARE | Facility: CLINIC | Age: 58
End: 2023-08-23
Payer: COMMERCIAL

## 2023-09-06 ENCOUNTER — OFFICE VISIT (OUTPATIENT)
Dept: PRIMARY CARE | Facility: CLINIC | Age: 58
End: 2023-09-06
Payer: COMMERCIAL

## 2023-09-06 VITALS — SYSTOLIC BLOOD PRESSURE: 112 MMHG | DIASTOLIC BLOOD PRESSURE: 72 MMHG

## 2023-09-06 DIAGNOSIS — S29.019A THORACIC MYOFASCIAL STRAIN, INITIAL ENCOUNTER: ICD-10-CM

## 2023-09-06 DIAGNOSIS — M99.09 SEGMENTAL AND SOMATIC DYSFUNCTION: ICD-10-CM

## 2023-09-06 DIAGNOSIS — K75.81 NASH (NONALCOHOLIC STEATOHEPATITIS): Primary | ICD-10-CM

## 2023-09-06 DIAGNOSIS — S16.1XXA STRAIN OF NECK MUSCLE, INITIAL ENCOUNTER: ICD-10-CM

## 2023-09-06 PROCEDURE — 98926 OSTEOPATH MANJ 3-4 REGIONS: CPT | Performed by: FAMILY MEDICINE

## 2023-09-06 PROCEDURE — 1036F TOBACCO NON-USER: CPT | Performed by: FAMILY MEDICINE

## 2023-09-06 PROCEDURE — 99214 OFFICE O/P EST MOD 30 MIN: CPT | Performed by: FAMILY MEDICINE

## 2023-09-06 NOTE — PROGRESS NOTES
Subjective     Patient ID: [id number]     Eric Alfred is a 58 y.o. female who presents for OMT.    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  Getrting ready for business trip to [place].  Dull pain in left flank.  Possible strain from Pilates.  Denies cervical or thoracic radicular symptoms    Norris: Patient was diagnosed with ultrasound in 2019.  She is requesting further evaluation.    Objective   /72    Physical Exam:   Generalized decreased range of motion of neck during passive range of motion testing.  Tenderness to palpation along lower cervical and mid thoracic spine.    Offered osteopathic manipulative therapy as a treatment option.  Explained the procedure as well as potential benefits and side effects.  Patient wishes to proceed.    Osteopathic manipulative therapy (OMT): Muscle energy technique and HVLA to the cervical, thoracic and lumbar spine.  Good results bilaterally.  Increased range of motion and less pain after manipulation    Assessment/Plan   1. NORRIS (nonalcoholic steatohepatitis)  - Referral to Gastroenterology; Future    2. Strain of neck muscle, initial encounter  Home exercise program to maintain flexibility; massotherapy    3. Thoracic myofascial strain, initial encounter  Home exercise program to maintain flexibility; massotherapy    4. Segmental and somatic dysfunction        Follow up in January as scheduled for annual comprehensive medical evaluation.    I will continue to monitor, evaluate, assess and treat all problems/diagnoses as appropriate and continue to collaborate with specialists.    Contact office or send a  MY Chart message with any questions or concerns    Encouraged to sign up with my  My Chart  Patient will only be notified of labs that require medical intervention.    Prescriptions will not be filled unless you are compliant with your follow up appointments or have a follow up  appointment scheduled as per instruction of your physician. Refills should be requested at the time of your visit.    **Charting was completed using voice recognition technology and may include unintended errors**    Keith Dhaliwal DO, [facility]  [address]  [place] [address] [phone]    Problem List Items Addressed This Visit    None  Visit Diagnoses       NORRIS (nonalcoholic steatohepatitis)    -  Primary    Relevant Orders    Referral to Gastroenterology            Keith Dhaliwal DO

## 2023-12-13 ENCOUNTER — OFFICE VISIT (OUTPATIENT)
Dept: PRIMARY CARE | Facility: CLINIC | Age: 58
End: 2023-12-13
Payer: COMMERCIAL

## 2023-12-13 VITALS
SYSTOLIC BLOOD PRESSURE: 134 MMHG | HEART RATE: 80 BPM | WEIGHT: 153 LBS | DIASTOLIC BLOOD PRESSURE: 88 MMHG | HEIGHT: 65 IN | BODY MASS INDEX: 25.49 KG/M2 | OXYGEN SATURATION: 96 %

## 2023-12-13 DIAGNOSIS — R03.0 ELEVATED BP WITHOUT DIAGNOSIS OF HYPERTENSION: Primary | ICD-10-CM

## 2023-12-13 PROCEDURE — 99213 OFFICE O/P EST LOW 20 MIN: CPT | Performed by: FAMILY MEDICINE

## 2023-12-13 PROCEDURE — 1036F TOBACCO NON-USER: CPT | Performed by: FAMILY MEDICINE

## 2023-12-13 RX ORDER — SERTRALINE HYDROCHLORIDE 50 MG/1
50 TABLET, FILM COATED ORAL DAILY
Qty: 90 TABLET | Refills: 2 | Status: SHIPPED | OUTPATIENT
Start: 2023-12-13 | End: 2024-09-08

## 2023-12-13 NOTE — PROGRESS NOTES
"Subjective     Patient ID: [id number]     Eric Alfred is a 58 y.o. female who presents for elevated blood pressure    HPI  Home and GI office 140-160s systolic.  Getting thyroid checked by endocrinologist.  Trying to lose weight.  No energy drinks.  Taking Allegra D prn x yrs.  Denies chest pain, sob, tinnitus, headache.  Did experience fast heart rate and some SOB while waking yesterday.    Stress from former caregiver  Mother in and out of hospital  Getting a mini-tummy tuck soon  GI for NORRIS/cirrhosis - labs and Fibroscan ordered.  Dr. Clayton with NSG    Objective   /88   Pulse 80   Ht 1.651 m (5' 5\")   Wt 69.4 kg (153 lb)   SpO2 96%   BMI 25.46 kg/m²    Physical Exam:   Alert and oriented x3.  Eyes: EOM grossly intact  Neck supple without lymph adenopathy or carotid bruit.  No masses or thyromegaly  Heart regular rate and rhythm without murmur.  Lungs clear to auscultation.  Legs without edema.  Gait is non-antalgic  Speech clear.  Hearing adequate.    Assessment/Plan   1. Elevated BP without diagnosis of hypertension  Stress reduction would be ideal   Start Zoloft 50 mg once daily.  Dispense #90 with 2 refills  Anticipate effectiveness within 4 to 6 weeks.  Discussed Valsalva maneuver for temporary relief of elevated heart rate.    Return to office in 6 months for comprehensive medical evaluation, long-term medication use monitoring, and preventative services screening    We will continue to monitor, evaluate, assess and treat all problems/diagnoses as appropriate and continue to collaborate with specialists.    Encouraged to sign up with [facility]    Contact office or send a  [employer] message with any questions or concerns    Patient will only be notified of labs that require medical intervention.    Prescriptions will not be filled unless you are compliant with your follow up appointments or have a follow up appointment scheduled as per instruction of your physician. Refills should be requested " at the time of your visit.    **Charting was completed using voice recognition technology and may include unintended errors**    Keith Dhaliwal DO, [facility]  [address]  [place] [address] [phone]      Problem List Items Addressed This Visit    None      Keith Dhaliwal DO

## 2024-02-16 ENCOUNTER — HOSPITAL ENCOUNTER (OUTPATIENT)
Dept: RADIOLOGY | Facility: CLINIC | Age: 59
Discharge: HOME | End: 2024-02-16
Payer: COMMERCIAL

## 2024-02-16 ENCOUNTER — LAB (OUTPATIENT)
Dept: LAB | Facility: LAB | Age: 59
End: 2024-02-16
Payer: COMMERCIAL

## 2024-02-16 DIAGNOSIS — R93.2 ABNORMAL FINDINGS ON DIAGNOSTIC IMAGING OF LIVER AND BILIARY TRACT: ICD-10-CM

## 2024-02-16 DIAGNOSIS — K76.0 FATTY (CHANGE OF) LIVER, NOT ELSEWHERE CLASSIFIED: Primary | ICD-10-CM

## 2024-02-16 DIAGNOSIS — K76.0 FATTY (CHANGE OF) LIVER, NOT ELSEWHERE CLASSIFIED: ICD-10-CM

## 2024-02-16 LAB
ALBUMIN SERPL BCP-MCNC: 4.4 G/DL (ref 3.4–5)
ALP SERPL-CCNC: 64 U/L (ref 33–110)
ALT SERPL W P-5'-P-CCNC: 24 U/L (ref 7–45)
ANION GAP SERPL CALC-SCNC: 12 MMOL/L (ref 10–20)
AST SERPL W P-5'-P-CCNC: 22 U/L (ref 9–39)
BASOPHILS # BLD AUTO: 0.05 X10*3/UL (ref 0–0.1)
BASOPHILS NFR BLD AUTO: 0.8 %
BILIRUB SERPL-MCNC: 0.4 MG/DL (ref 0–1.2)
BUN SERPL-MCNC: 17 MG/DL (ref 6–23)
CALCIUM SERPL-MCNC: 10.1 MG/DL (ref 8.6–10.3)
CHLORIDE SERPL-SCNC: 102 MMOL/L (ref 98–107)
CO2 SERPL-SCNC: 28 MMOL/L (ref 21–32)
CREAT SERPL-MCNC: 0.69 MG/DL (ref 0.5–1.05)
EGFRCR SERPLBLD CKD-EPI 2021: >90 ML/MIN/1.73M*2
EOSINOPHIL # BLD AUTO: 0.19 X10*3/UL (ref 0–0.7)
EOSINOPHIL NFR BLD AUTO: 3 %
ERYTHROCYTE [DISTWIDTH] IN BLOOD BY AUTOMATED COUNT: 11.5 % (ref 11.5–14.5)
GLUCOSE SERPL-MCNC: 90 MG/DL (ref 74–99)
HBV CORE AB SER QL: NONREACTIVE
HBV SURFACE AB SER-ACNC: <3.1 MIU/ML
HBV SURFACE AG SERPL QL IA: NONREACTIVE
HCT VFR BLD AUTO: 41.5 % (ref 36–46)
HCV AB SER QL: NONREACTIVE
HGB BLD-MCNC: 13.7 G/DL (ref 12–16)
IMM GRANULOCYTES # BLD AUTO: 0.01 X10*3/UL (ref 0–0.7)
IMM GRANULOCYTES NFR BLD AUTO: 0.2 % (ref 0–0.9)
LYMPHOCYTES # BLD AUTO: 2.73 X10*3/UL (ref 1.2–4.8)
LYMPHOCYTES NFR BLD AUTO: 43.7 %
MCH RBC QN AUTO: 31.8 PG (ref 26–34)
MCHC RBC AUTO-ENTMCNC: 33 G/DL (ref 32–36)
MCV RBC AUTO: 96 FL (ref 80–100)
MONOCYTES # BLD AUTO: 0.54 X10*3/UL (ref 0.1–1)
MONOCYTES NFR BLD AUTO: 8.6 %
NEUTROPHILS # BLD AUTO: 2.73 X10*3/UL (ref 1.2–7.7)
NEUTROPHILS NFR BLD AUTO: 43.7 %
NRBC BLD-RTO: 0 /100 WBCS (ref 0–0)
PLATELET # BLD AUTO: 230 X10*3/UL (ref 150–450)
POTASSIUM SERPL-SCNC: 4.4 MMOL/L (ref 3.5–5.3)
PROT SERPL-MCNC: 7.8 G/DL (ref 6.4–8.2)
RBC # BLD AUTO: 4.31 X10*6/UL (ref 4–5.2)
SODIUM SERPL-SCNC: 138 MMOL/L (ref 136–145)
TSH SERPL-ACNC: 0.74 MIU/L (ref 0.44–3.98)
WBC # BLD AUTO: 6.3 X10*3/UL (ref 4.4–11.3)

## 2024-02-16 PROCEDURE — 74183 MRI ABD W/O CNTR FLWD CNTR: CPT | Mod: LIO | Performed by: RADIOLOGY

## 2024-02-16 PROCEDURE — 80321 ALCOHOLS BIOMARKERS 1OR 2: CPT

## 2024-02-16 PROCEDURE — 80053 COMPREHEN METABOLIC PANEL: CPT

## 2024-02-16 PROCEDURE — A9575 INJ GADOTERATE MEGLUMI 0.1ML: HCPCS | Performed by: INTERNAL MEDICINE

## 2024-02-16 PROCEDURE — 86706 HEP B SURFACE ANTIBODY: CPT

## 2024-02-16 PROCEDURE — 84443 ASSAY THYROID STIM HORMONE: CPT

## 2024-02-16 PROCEDURE — 85025 COMPLETE CBC W/AUTO DIFF WBC: CPT

## 2024-02-16 PROCEDURE — 86803 HEPATITIS C AB TEST: CPT

## 2024-02-16 PROCEDURE — 2550000001 HC RX 255 CONTRASTS: Performed by: INTERNAL MEDICINE

## 2024-02-16 PROCEDURE — 36415 COLL VENOUS BLD VENIPUNCTURE: CPT

## 2024-02-16 PROCEDURE — 74183 MRI ABD W/O CNTR FLWD CNTR: CPT | Mod: LIO

## 2024-02-16 PROCEDURE — 87340 HEPATITIS B SURFACE AG IA: CPT

## 2024-02-16 PROCEDURE — 86704 HEP B CORE ANTIBODY TOTAL: CPT

## 2024-02-16 RX ORDER — GADOTERATE MEGLUMINE 376.9 MG/ML
0.2 INJECTION INTRAVENOUS
Status: COMPLETED | OUTPATIENT
Start: 2024-02-16 | End: 2024-02-16

## 2024-02-16 RX ADMIN — GADOTERATE MEGLUMINE 14 ML: 376.9 INJECTION INTRAVENOUS at 14:35

## 2024-02-20 LAB
LABORATORY REPORT: NORMAL
PETH INTERPRETATION: NORMAL
PLPETH BLD-MCNC: 13 NG/ML
POPETH BLD-MCNC: 25 NG/ML

## 2024-07-01 ENCOUNTER — APPOINTMENT (OUTPATIENT)
Dept: PRIMARY CARE | Facility: CLINIC | Age: 59
End: 2024-07-01
Payer: COMMERCIAL

## 2024-07-17 ENCOUNTER — APPOINTMENT (OUTPATIENT)
Dept: PRIMARY CARE | Facility: CLINIC | Age: 59
End: 2024-07-17
Payer: COMMERCIAL

## 2024-08-05 DIAGNOSIS — E78.2 HYPERLIPIDEMIA, MIXED: Primary | ICD-10-CM

## 2024-08-05 DIAGNOSIS — K75.81 NASH (NONALCOHOLIC STEATOHEPATITIS): ICD-10-CM

## 2024-08-12 ENCOUNTER — APPOINTMENT (OUTPATIENT)
Dept: PRIMARY CARE | Facility: CLINIC | Age: 59
End: 2024-08-12
Payer: COMMERCIAL

## 2024-08-23 ENCOUNTER — LAB (OUTPATIENT)
Dept: LAB | Facility: LAB | Age: 59
End: 2024-08-23
Payer: COMMERCIAL

## 2024-08-23 DIAGNOSIS — R30.0 DYSURIA: ICD-10-CM

## 2024-08-23 DIAGNOSIS — E55.9 VITAMIN D DEFICIENCY: ICD-10-CM

## 2024-08-23 DIAGNOSIS — E78.2 HYPERLIPIDEMIA, MIXED: ICD-10-CM

## 2024-08-23 DIAGNOSIS — E03.9 HYPOTHYROIDISM, UNSPECIFIED TYPE: ICD-10-CM

## 2024-08-23 DIAGNOSIS — K75.81 NASH (NONALCOHOLIC STEATOHEPATITIS): ICD-10-CM

## 2024-08-23 LAB
25(OH)D3 SERPL-MCNC: 48 NG/ML (ref 30–100)
ALBUMIN SERPL BCP-MCNC: 4.1 G/DL (ref 3.4–5)
ALP SERPL-CCNC: 61 U/L (ref 33–110)
ALT SERPL W P-5'-P-CCNC: 17 U/L (ref 7–45)
ANION GAP SERPL CALC-SCNC: 13 MMOL/L (ref 10–20)
APPEARANCE UR: CLEAR
AST SERPL W P-5'-P-CCNC: 21 U/L (ref 9–39)
BILIRUB SERPL-MCNC: 0.7 MG/DL (ref 0–1.2)
BILIRUB UR STRIP.AUTO-MCNC: NEGATIVE MG/DL
BUN SERPL-MCNC: 11 MG/DL (ref 6–23)
CALCIUM SERPL-MCNC: 9.6 MG/DL (ref 8.6–10.3)
CHLORIDE SERPL-SCNC: 100 MMOL/L (ref 98–107)
CHOLEST SERPL-MCNC: 261 MG/DL (ref 0–199)
CHOLESTEROL/HDL RATIO: 5
CO2 SERPL-SCNC: 27 MMOL/L (ref 21–32)
COLOR UR: ABNORMAL
CORTIS SERPL-MCNC: 13.8 UG/DL (ref 2.5–20)
CREAT SERPL-MCNC: 0.77 MG/DL (ref 0.5–1.05)
CRP SERPL HS-MCNC: 2.1 MG/L
EGFRCR SERPLBLD CKD-EPI 2021: 90 ML/MIN/1.73M*2
ERYTHROCYTE [DISTWIDTH] IN BLOOD BY AUTOMATED COUNT: 11.6 % (ref 11.5–14.5)
GLUCOSE SERPL-MCNC: 105 MG/DL (ref 74–99)
GLUCOSE UR STRIP.AUTO-MCNC: NORMAL MG/DL
HCT VFR BLD AUTO: 43.1 % (ref 36–46)
HDLC SERPL-MCNC: 51.7 MG/DL
HGB BLD-MCNC: 14.2 G/DL (ref 12–16)
KETONES UR STRIP.AUTO-MCNC: NEGATIVE MG/DL
LDLC SERPL CALC-MCNC: 172 MG/DL
LEUKOCYTE ESTERASE UR QL STRIP.AUTO: ABNORMAL
MCH RBC QN AUTO: 31.3 PG (ref 26–34)
MCHC RBC AUTO-ENTMCNC: 32.9 G/DL (ref 32–36)
MCV RBC AUTO: 95 FL (ref 80–100)
MUCOUS THREADS #/AREA URNS AUTO: NORMAL /LPF
NITRITE UR QL STRIP.AUTO: NEGATIVE
NON HDL CHOLESTEROL: 209 MG/DL (ref 0–149)
NRBC BLD-RTO: 0 /100 WBCS (ref 0–0)
PH UR STRIP.AUTO: 6.5 [PH]
PLATELET # BLD AUTO: 212 X10*3/UL (ref 150–450)
POTASSIUM SERPL-SCNC: 4.3 MMOL/L (ref 3.5–5.3)
PROT SERPL-MCNC: 7.2 G/DL (ref 6.4–8.2)
PROT UR STRIP.AUTO-MCNC: NEGATIVE MG/DL
RBC # BLD AUTO: 4.53 X10*6/UL (ref 4–5.2)
RBC # UR STRIP.AUTO: NEGATIVE /UL
RBC #/AREA URNS AUTO: NORMAL /HPF
SODIUM SERPL-SCNC: 136 MMOL/L (ref 136–145)
SP GR UR STRIP.AUTO: 1.02
T4 FREE SERPL-MCNC: 1.14 NG/DL (ref 0.61–1.12)
TRIGL SERPL-MCNC: 189 MG/DL (ref 0–149)
UROBILINOGEN UR STRIP.AUTO-MCNC: NORMAL MG/DL
VLDL: 38 MG/DL (ref 0–40)
WBC # BLD AUTO: 6.7 X10*3/UL (ref 4.4–11.3)
WBC #/AREA URNS AUTO: NORMAL /HPF

## 2024-08-23 PROCEDURE — 80053 COMPREHEN METABOLIC PANEL: CPT

## 2024-08-23 PROCEDURE — 86141 C-REACTIVE PROTEIN HS: CPT

## 2024-08-23 PROCEDURE — 36415 COLL VENOUS BLD VENIPUNCTURE: CPT

## 2024-08-23 PROCEDURE — 80061 LIPID PANEL: CPT

## 2024-08-23 PROCEDURE — 82533 TOTAL CORTISOL: CPT

## 2024-08-23 PROCEDURE — 82306 VITAMIN D 25 HYDROXY: CPT

## 2024-08-23 PROCEDURE — 81001 URINALYSIS AUTO W/SCOPE: CPT

## 2024-08-23 PROCEDURE — 85027 COMPLETE CBC AUTOMATED: CPT

## 2024-08-23 PROCEDURE — 84439 ASSAY OF FREE THYROXINE: CPT

## 2024-08-27 ENCOUNTER — APPOINTMENT (OUTPATIENT)
Dept: PRIMARY CARE | Facility: CLINIC | Age: 59
End: 2024-08-27
Payer: COMMERCIAL

## 2024-08-27 VITALS
BODY MASS INDEX: 25.99 KG/M2 | SYSTOLIC BLOOD PRESSURE: 137 MMHG | DIASTOLIC BLOOD PRESSURE: 95 MMHG | HEART RATE: 62 BPM | HEIGHT: 65 IN | WEIGHT: 156 LBS | OXYGEN SATURATION: 98 %

## 2024-08-27 DIAGNOSIS — E03.9 HYPOTHYROIDISM, UNSPECIFIED TYPE: ICD-10-CM

## 2024-08-27 DIAGNOSIS — E78.01 FAMILIAL HYPERCHOLESTEROLEMIA: Primary | ICD-10-CM

## 2024-08-27 DIAGNOSIS — E04.2 MULTIPLE THYROID NODULES: ICD-10-CM

## 2024-08-27 DIAGNOSIS — K75.81 NASH (NONALCOHOLIC STEATOHEPATITIS): ICD-10-CM

## 2024-08-27 PROCEDURE — 99213 OFFICE O/P EST LOW 20 MIN: CPT | Performed by: FAMILY MEDICINE

## 2024-08-27 PROCEDURE — 3008F BODY MASS INDEX DOCD: CPT | Performed by: FAMILY MEDICINE

## 2024-08-27 RX ORDER — DIPHENHYDRAMINE HCL 25 MG
CAPSULE ORAL
COMMUNITY

## 2024-08-27 ASSESSMENT — PATIENT HEALTH QUESTIONNAIRE - PHQ9
1. LITTLE INTEREST OR PLEASURE IN DOING THINGS: NOT AT ALL
SUM OF ALL RESPONSES TO PHQ9 QUESTIONS 1 & 2: 0
2. FEELING DOWN, DEPRESSED OR HOPELESS: NOT AT ALL

## 2024-08-27 NOTE — PROGRESS NOTES
"Subjective     Patient ID: [id number]     Eric Alfred is a 59 y.o. female who presents for follow up on blood work.    HPI  High LDL cholesterol and elevated hsCRP.  Coronary calcium score from 2021 is 0.  Non smoker.  Not obese.  Exercising regularly.  Generally healthy diet.      Objective   BP (!) 137/95   Pulse 62   Ht 1.651 m (5' 5\")   Wt 70.8 kg (156 lb)   SpO2 98%   BMI 25.96 kg/m²    Physical Exam: Healthy-appearing female.  Lab reviewed in detail with patient.  Reviewed latest liver MRI showing resolution of fatty liver disease.    Assessment/Plan   1. Familial hypercholesterolemia  Low likelihood of coronary artery disease due to lack of additional risk factors.    2. NORRIS (nonalcoholic steatohepatitis)  Resolved.    3. Hypothyroidism, unspecified type  Synthroid dose will remain the same    4. Multiple thyroid nodules  Ultrasound ordered        Return to office in 6 months for comprehensive medical evaluation, long-term medication use monitoring, and preventative services screening    We will continue to monitor, evaluate, assess and treat all problems/diagnoses as appropriate and continue to collaborate with specialists.    Encouraged to sign up with [facility]    Contact office or send a  [employer] message with any questions or concerns    Patient will only be notified of labs that require medical intervention.    Prescriptions will not be filled unless you are compliant with your follow up appointments or have a follow up appointment scheduled as per instruction of your physician. Refills should be requested at the time of your visit.    **Charting was completed using voice recognition technology and may include unintended errors**    Keith Dhaliwal DO, [facility]  [address]  [place] [address] [phone]  Problem List Items Addressed This Visit    None      Keith Dhaliwal DO   "

## 2024-09-17 ENCOUNTER — HOSPITAL ENCOUNTER (OUTPATIENT)
Dept: RADIOLOGY | Facility: CLINIC | Age: 59
Discharge: HOME | End: 2024-09-17
Payer: COMMERCIAL

## 2024-09-17 VITALS — HEIGHT: 65 IN | WEIGHT: 155 LBS | BODY MASS INDEX: 25.83 KG/M2

## 2024-09-17 DIAGNOSIS — Z12.31 ENCOUNTER FOR SCREENING MAMMOGRAM FOR MALIGNANT NEOPLASM OF BREAST: ICD-10-CM

## 2024-09-17 PROCEDURE — 77067 SCR MAMMO BI INCL CAD: CPT | Performed by: STUDENT IN AN ORGANIZED HEALTH CARE EDUCATION/TRAINING PROGRAM

## 2024-09-17 PROCEDURE — 77063 BREAST TOMOSYNTHESIS BI: CPT | Performed by: STUDENT IN AN ORGANIZED HEALTH CARE EDUCATION/TRAINING PROGRAM

## 2024-09-17 PROCEDURE — 77067 SCR MAMMO BI INCL CAD: CPT

## 2024-10-05 ENCOUNTER — OFFICE VISIT (OUTPATIENT)
Dept: URGENT CARE | Age: 59
End: 2024-10-05
Payer: COMMERCIAL

## 2024-10-05 ENCOUNTER — ANCILLARY PROCEDURE (OUTPATIENT)
Dept: URGENT CARE | Age: 59
End: 2024-10-05
Payer: COMMERCIAL

## 2024-10-05 VITALS
OXYGEN SATURATION: 98 % | BODY MASS INDEX: 25.83 KG/M2 | RESPIRATION RATE: 18 BRPM | SYSTOLIC BLOOD PRESSURE: 131 MMHG | WEIGHT: 155 LBS | TEMPERATURE: 98.3 F | HEART RATE: 77 BPM | HEIGHT: 65 IN | DIASTOLIC BLOOD PRESSURE: 83 MMHG

## 2024-10-05 DIAGNOSIS — S92.531A DISPLACED FRACTURE OF DISTAL PHALANX OF RIGHT LESSER TOE(S), INITIAL ENCOUNTER FOR CLOSED FRACTURE: Primary | ICD-10-CM

## 2024-10-05 DIAGNOSIS — S99.921A TOE INJURY, RIGHT, INITIAL ENCOUNTER: ICD-10-CM

## 2024-10-05 ASSESSMENT — ENCOUNTER SYMPTOMS
ARTHRALGIAS: 1
NUMBNESS: 0
COUGH: 0
CHEST TIGHTNESS: 0
JOINT SWELLING: 1
FEVER: 0
WOUND: 0
WEAKNESS: 0
WHEEZING: 0
MYALGIAS: 0
SHORTNESS OF BREATH: 0
CHILLS: 0

## 2024-10-05 ASSESSMENT — PAIN SCALES - GENERAL: PAINLEVEL: 2

## 2024-10-05 NOTE — PROGRESS NOTES
Subjective   Patient ID: Eric Alfred is a 59 y.o. female. They present today with a chief complaint of Toe Injury (Right 2nd toe injury 1 week ago. Phone fell on toe).    History of Present Illness  Patient reports that she injured her right 2nd toe on 9/23/24 when a cell phone was dropped on it.  Patient complains of 3/10 throbbing pain to the DIP of the toe.  Patient states that her symptoms are improving gradually, but the toe appears abnormal.      History provided by:  Patient   used: No        Past Medical History  Allergies as of 10/05/2024 - Reviewed 10/05/2024   Allergen Reaction Noted    Sulfa (sulfonamide antibiotics) Anaphylaxis and Swelling 01/29/2003       (Not in a hospital admission)       Past Medical History:   Diagnosis Date    COVID-19 06/01/2022    COVID-19 virus infection    Other specified enthesopathies of right lower limb, excluding foot 07/17/2017    Tendinitis of right hip flexor    Personal history of other diseases of the digestive system 09/22/2018    History of hemorrhoids    Personal history of other infectious and parasitic diseases     History of herpes labialis       Past Surgical History:   Procedure Laterality Date    OTHER SURGICAL HISTORY  09/04/2019    Liver lobectomy    OTHER SURGICAL HISTORY  06/01/2022    Cholecystectomy        reports that she has never smoked. She has never been exposed to tobacco smoke. She has never used smokeless tobacco.    Review of Systems  Review of Systems   Constitutional:  Negative for chills and fever.   Respiratory:  Negative for cough, chest tightness, shortness of breath and wheezing.    Cardiovascular:  Negative for chest pain.   Musculoskeletal:  Positive for arthralgias and joint swelling. Negative for myalgias.   Skin:  Negative for wound.   Neurological:  Negative for weakness and numbness.                                  Objective    Vitals:    10/05/24 1131   BP: 131/83   BP Location: Left arm   Patient  "Position: Sitting   BP Cuff Size: Adult   Pulse: 77   Resp: 18   Temp: 36.8 °C (98.3 °F)   TempSrc: Oral   SpO2: 98%   Weight: 70.3 kg (155 lb)   Height: 1.651 m (5' 5\")     No LMP recorded. Patient is postmenopausal.    Physical Exam  Constitutional:       General: She is not in acute distress.     Appearance: She is not ill-appearing.   Cardiovascular:      Rate and Rhythm: Normal rate and regular rhythm.      Heart sounds: No murmur heard.     No friction rub.   Pulmonary:      Effort: Pulmonary effort is normal. No respiratory distress.      Breath sounds: No wheezing, rhonchi or rales.   Musculoskeletal:         General: Swelling, tenderness and signs of injury present.      Comments: Distal phalanx of the right 2nd toe has a valgus angulation.   Skin:     Findings: Erythema present. No bruising or rash.   Neurological:      Mental Status: She is alert.         Procedures    Point of Care Test & Imaging Results from this visit  No results found for this visit on 10/05/24.   XR toe right 2+ views    Result Date: 10/5/2024  Interpreted By:  Adela Lowe, STUDY: XR TOE RIGHT 2+ VIEWS; ;  10/5/2024 11:44 am   INDICATION: Signs/Symptoms:right 2nd toe injury, phone fell onto toe.   COMPARISON: None.   ACCESSION NUMBER(S): [id number]   ORDERING CLINICIAN: FÉLIX CORTEZ   FINDINGS: Two views of right toe were performed.   There is an oblique fracture through distal phalangeal base of 2nd digit with suggestion of intra-articular extension to the distal interphalangeal joint. There is very minimal displacement with impaction of fracture fragments. There is mild soft tissue swelling about the 2nd toe. There are mild degenerative changes in the metatarsophalangeal and interphalangeal joints of the 2nd digit.       Acute oblique minimally displaced and impacted fracture through distal phalangeal base of 2nd digit as described above.     Signed by: Adela oLwe 10/5/2024 11:53 AM Dictation workstation:   [id number] "     Diagnostic study results (if any) were reviewed by Salinas Hernandez DO.  Patient refuses Post-op shoe.    Assessment/Plan   Allergies, medications, history, and pertinent labs/EKGs/Imaging reviewed by Salinas Hernandez DO.     Orders and Diagnoses  Diagnoses and all orders for this visit:  Toe injury, right, initial encounter  -     XR toe right 2+ views; Future      Medical Admin Record      Patient disposition: Home    Electronically signed by Salinas Hernandez DO  12:19 PM

## 2024-10-07 ENCOUNTER — OFFICE VISIT (OUTPATIENT)
Dept: ORTHOPEDIC SURGERY | Facility: CLINIC | Age: 59
End: 2024-10-07
Payer: COMMERCIAL

## 2024-10-07 DIAGNOSIS — S92.911A CLOSED NONDISPLACED FRACTURE OF PHALANX OF TOE OF RIGHT FOOT, UNSPECIFIED TOE, INITIAL ENCOUNTER: Primary | ICD-10-CM

## 2024-10-07 DIAGNOSIS — M79.671 RIGHT FOOT PAIN: ICD-10-CM

## 2024-10-07 PROCEDURE — 28510 TREATMENT OF TOE FRACTURE: CPT | Performed by: STUDENT IN AN ORGANIZED HEALTH CARE EDUCATION/TRAINING PROGRAM

## 2024-10-07 PROCEDURE — 99213 OFFICE O/P EST LOW 20 MIN: CPT | Mod: 57 | Performed by: STUDENT IN AN ORGANIZED HEALTH CARE EDUCATION/TRAINING PROGRAM

## 2024-10-07 PROCEDURE — 99214 OFFICE O/P EST MOD 30 MIN: CPT | Performed by: STUDENT IN AN ORGANIZED HEALTH CARE EDUCATION/TRAINING PROGRAM

## 2024-10-07 NOTE — PROGRESS NOTES
Acute Injury Established Patient Visit    HPI: Eric is a 59 y.o.female who presents today with new complaints of right foot injury.  She states that one of her colleagues dropped a phone on her foot.  This was at a work event.  This is a Worker's Comp. claim.  She had x-rays at the urgent care [facility] and was found to have a distal phalanx fracture.  She was concerned about the intra-articular extension.  She has not been doing much for including no postop shoe or linda tape.  She has some swelling over the toe.  It is not stopping her from doing much.    Plan: For this right second distal phalanx fracture, we will provide her with a postop shoe, start her on ibuprofen, continue with ice, elevation, and rest.  Follow-up in 3 weeks with repeat x-rays of the right second toe.  She can also linda tape.    Assessment:   Problem List Items Addressed This Visit    None  Visit Diagnoses       Closed nondisplaced fracture of phalanx of toe of right foot, unspecified toe, initial encounter    -  Primary    Relevant Orders    Post-op shoe    Right foot pain                Diagnostics: Reviewed all relevant imaging including x-ray, MRI, CT, and US.      Procedure:  Procedures    Physical Exam:  GENERAL:  No obvious acute distress.  NEURO:  Distally neurovascularly intact.  Sensation intact to light touch.  Extremity: Right foot examination:  Skin is intact;  No erythema or warmth;  Swollen, bruised, and tender over the right second distal phalanx.  No obvious deformity;  No clinical signs of infection;  No pain over the base of the fifth metatarsal bone;  No pain in the midfoot;  No pain over the metatarsal bones;  No pain over the cuboid bone;  No pain over the calcaneus;  No pain over the plantar aponeurosis;  No pain over the proximal phalanx of the right great toe;  No pain over distal phalanx of the right great toe; and  Neurovascularly intact.    Orders Placed This Encounter    Post-op shoe      At the conclusion of  the visit there were no further questions by the patient/family regarding their plan of care.  Patient was instructed to call or return with any issues, questions, or concerns regarding their injury and/or treatment plan described above.     10/07/24 at 5:16 PM - Jaquan Perez DO    Office: [phone]    This note was prepared using voice recognition software.  The details of this note are correct and have been reviewed, and corrected to the best of my ability.  Some grammatical errors may persist related to the Dragon software.

## 2024-11-19 DIAGNOSIS — A09 TRAVELER'S DIARRHEA: Primary | ICD-10-CM

## 2024-11-19 RX ORDER — METRONIDAZOLE 500 MG/1
500 TABLET ORAL 3 TIMES DAILY
Qty: 30 TABLET | Refills: 0 | Status: SHIPPED | OUTPATIENT
Start: 2024-11-19 | End: 2024-11-29

## 2024-11-19 RX ORDER — CIPROFLOXACIN 500 MG/1
500 TABLET ORAL 2 TIMES DAILY
Qty: 20 TABLET | Refills: 0 | Status: SHIPPED | OUTPATIENT
Start: 2024-11-19 | End: 2024-11-29

## 2025-01-20 ENCOUNTER — APPOINTMENT (OUTPATIENT)
Dept: PRIMARY CARE | Facility: CLINIC | Age: 60
End: 2025-01-20
Payer: COMMERCIAL

## 2025-01-22 ENCOUNTER — OFFICE VISIT (OUTPATIENT)
Dept: PRIMARY CARE | Facility: CLINIC | Age: 60
End: 2025-01-22
Payer: COMMERCIAL

## 2025-01-22 DIAGNOSIS — M99.01 SOMATIC DYSFUNCTION OF CERVICAL REGION: ICD-10-CM

## 2025-01-22 DIAGNOSIS — M54.50 THORACOLUMBAR BACK PAIN: Primary | ICD-10-CM

## 2025-01-22 DIAGNOSIS — M54.6 THORACOLUMBAR BACK PAIN: Primary | ICD-10-CM

## 2025-01-22 DIAGNOSIS — M99.02 SOMATIC DYSFUNCTION OF THORACIC REGION: ICD-10-CM

## 2025-01-22 DIAGNOSIS — M99.03 SOMATIC DYSFUNCTION OF LUMBAR REGION: ICD-10-CM

## 2025-01-22 DIAGNOSIS — R48.8 ANOMIA: ICD-10-CM

## 2025-01-22 PROCEDURE — 98926 OSTEOPATH MANJ 3-4 REGIONS: CPT | Performed by: FAMILY MEDICINE

## 2025-01-22 PROCEDURE — 99213 OFFICE O/P EST LOW 20 MIN: CPT | Performed by: FAMILY MEDICINE

## 2025-01-22 NOTE — PROGRESS NOTES
Subjective     Patient ID: [id number]     Eric Alfred is a 59 y.o. female who presents for OMT.    HPI  Patient has been having pain in the left flank with radiation to the from the thoracic or lumbar spine.  She has had motor manipulation in the past and with like evaluation for OMT today.  Denies GI,  problems.  No sciatic radiation of pain.  Patient underwent abdominoplasty 1 year ago.  Pain has been present intermittently since that time.    Concerned about memory loss.  She is noting  intermittent word finding memory lapses.  Mother has dementia and other psychiatric issues.  Word finding problem began soon after abdominoplasty 1 year ago.  Also has significant mental stress managing her mother's medical conditions.      Objective   There were no vitals taken for this visit.   Physical Exam:   Neck range of motion normal  Left thoracolumbar paraspinal muscle tenderness to palpation.  Good range of motion of the thoracolumbar spine.  Sacroiliac joints grossly symmetric.  Leg lengths equal.  ASIS level.    Offered osteopathic manipulative therapy as a treatment option.  Explained the procedure as well as potential benefits and side effects.  Patient wishes to proceed.    Osteopathic manipulative therapy (OMT): Muscle energy technique and HVLA to the cervical, thoracic and lumbar spine.  Good results bilaterally.  Increased range of motion and less pain after manipulation    Assessment/Plan     1. Thoracolumbar back pain (Primary)    2. Somatic dysfunction of cervical region    3. Somatic dysfunction of thoracic region    4. Somatic dysfunction of lumbar region    5. Anomia - word finding difficulty  While transient word-finding difficulties are certainly an annoyance, it's important to note they often fall within regular, age-related changes or temporary fluctuations in cognitive function  -Stress, fatigue, and emotional states can also contribute to fleeting word retrieval challenges.  Reference: [employer]  speech pathology website    Keith Dhaliwal, DO

## 2025-01-28 ENCOUNTER — OFFICE VISIT (OUTPATIENT)
Dept: URGENT CARE | Age: 60
End: 2025-01-28
Payer: COMMERCIAL

## 2025-01-28 VITALS
WEIGHT: 155 LBS | BODY MASS INDEX: 25.83 KG/M2 | HEIGHT: 65 IN | OXYGEN SATURATION: 98 % | SYSTOLIC BLOOD PRESSURE: 129 MMHG | TEMPERATURE: 98.4 F | RESPIRATION RATE: 18 BRPM | DIASTOLIC BLOOD PRESSURE: 82 MMHG | HEART RATE: 97 BPM

## 2025-01-28 DIAGNOSIS — J32.4 CHRONIC PANSINUSITIS: ICD-10-CM

## 2025-01-28 DIAGNOSIS — R42 VERTIGO: Primary | ICD-10-CM

## 2025-01-28 RX ORDER — MECLIZINE HYDROCHLORIDE 25 MG/1
25 TABLET ORAL EVERY 6 HOURS PRN
Qty: 40 TABLET | Refills: 0 | Status: SHIPPED | OUTPATIENT
Start: 2025-01-28

## 2025-01-28 RX ORDER — AZELASTINE 1 MG/ML
1 SPRAY, METERED NASAL 2 TIMES DAILY
Qty: 30 ML | Refills: 0 | Status: SHIPPED | OUTPATIENT
Start: 2025-01-28 | End: 2026-01-28

## 2025-01-28 ASSESSMENT — PATIENT HEALTH QUESTIONNAIRE - PHQ9
SUM OF ALL RESPONSES TO PHQ9 QUESTIONS 1 & 2: 0
2. FEELING DOWN, DEPRESSED OR HOPELESS: NOT AT ALL
1. LITTLE INTEREST OR PLEASURE IN DOING THINGS: NOT AT ALL

## 2025-01-28 ASSESSMENT — ENCOUNTER SYMPTOMS
DIZZINESS: 1
COUGH: 1

## 2025-01-28 NOTE — PROGRESS NOTES
"Subjective   Patient ID: Eric Alfred is a 59 y.o. female. They present today with a chief complaint of Dizziness (Started this morning/\"Patient states she had a vertigo episode\" /), Nasal Congestion (Started 1 week ago ), and Cough (Started 1 week ago /Chills,post nasal drip,scratchy throat /\"Patient states her lung feels inflamed on the right side of her chest\").    History of Present Illness  Subjective  Eric Alfred is a 59 y.o. female who presents for evaluation of symptoms of a URI. Symptoms include nasal congestion, post nasal drip, sinus pressure, and dizziness. Onset of symptoms was unable to say, but dizziness began last night. Patient states that she has chronic sinusitis and has not been feeling well since it has been cold.          Dizziness  Cough        Past Medical History  Allergies as of 01/28/2025 - Reviewed 01/28/2025   Allergen Reaction Noted    Sulfa (sulfonamide antibiotics) Anaphylaxis and Swelling 01/29/2003       (Not in a hospital admission)       Past Medical History:   Diagnosis Date    COVID-19 06/01/2022    COVID-19 virus infection    Other specified enthesopathies of right lower limb, excluding foot 07/17/2017    Tendinitis of right hip flexor    Personal history of other diseases of the digestive system 09/22/2018    History of hemorrhoids    Personal history of other infectious and parasitic diseases     History of herpes labialis       Past Surgical History:   Procedure Laterality Date    OTHER SURGICAL HISTORY  09/04/2019    Liver lobectomy    OTHER SURGICAL HISTORY  06/01/2022    Cholecystectomy        reports that she has never smoked. She has never been exposed to tobacco smoke. She has never used smokeless tobacco.    Review of Systems  Review of Systems   Respiratory:  Positive for cough.    Neurological:  Positive for dizziness.                                  Objective    Vitals:    01/28/25 1633   BP: 129/82   BP Location: Right arm   Patient Position: Sitting " "  Pulse: 97   Resp: 18   Temp: 36.9 °C (98.4 °F)   SpO2: 98%   Weight: 70.3 kg (155 lb)   Height: 1.651 m (5' 5\")     No LMP recorded. Patient is postmenopausal.    Physical Exam  Vitals and nursing note reviewed.   Constitutional:       General: She is not in acute distress.     Appearance: Normal appearance. She is not ill-appearing or toxic-appearing.   HENT:      Right Ear: Tympanic membrane, ear canal and external ear normal.      Left Ear: Tympanic membrane, ear canal and external ear normal.      Nose:      Right Turbinates: Swollen and pale.      Left Turbinates: Swollen and pale.      Mouth/Throat:      Mouth: Mucous membranes are moist.      Pharynx: Oropharynx is clear.   Eyes:      Extraocular Movements: Extraocular movements intact.      Pupils: Pupils are equal, round, and reactive to light.   Cardiovascular:      Rate and Rhythm: Normal rate and regular rhythm.      Pulses: Normal pulses.   Pulmonary:      Effort: Pulmonary effort is normal.   Musculoskeletal:      Cervical back: Normal range of motion and neck supple.   Neurological:      Mental Status: She is alert.         Procedures    Point of Care Test & Imaging Results from this visit  No results found for this visit on 01/28/25.   No results found.    Diagnostic study results (if any) were reviewed by Ping Murcia MD.    Assessment/Plan   Allergies, medications, history, and pertinent labs/EKGs/Imaging reviewed by Ping Murcia MD.     Medical Decision Making      Orders and Diagnoses  There are no diagnoses linked to this encounter.    Medical Admin Record      Patient disposition:     Electronically signed by Ping Murcia MD  5:03 PM      "

## 2025-01-28 NOTE — PATIENT INSTRUCTIONS
Azelastine as directed  Meclizine as needed for vertigo; no driving until symptoms have resolved.  Follow up with Primary Care Provider

## 2025-07-23 ENCOUNTER — APPOINTMENT (OUTPATIENT)
Dept: PRIMARY CARE | Facility: CLINIC | Age: 60
End: 2025-07-23
Payer: COMMERCIAL

## 2025-07-23 VITALS
BODY MASS INDEX: 25.49 KG/M2 | DIASTOLIC BLOOD PRESSURE: 85 MMHG | WEIGHT: 153 LBS | OXYGEN SATURATION: 99 % | SYSTOLIC BLOOD PRESSURE: 137 MMHG | HEIGHT: 65 IN | HEART RATE: 67 BPM

## 2025-07-23 DIAGNOSIS — A09 TRAVELER'S DIARRHEA: ICD-10-CM

## 2025-07-23 DIAGNOSIS — E03.9 HYPOTHYROIDISM, UNSPECIFIED TYPE: ICD-10-CM

## 2025-07-23 DIAGNOSIS — M13.0 POLYARTHRITIS: ICD-10-CM

## 2025-07-23 DIAGNOSIS — E55.9 VITAMIN D DEFICIENCY: ICD-10-CM

## 2025-07-23 DIAGNOSIS — R11.0 NAUSEA: ICD-10-CM

## 2025-07-23 DIAGNOSIS — Z00.00 HEALTHCARE MAINTENANCE: Primary | ICD-10-CM

## 2025-07-23 PROCEDURE — 1036F TOBACCO NON-USER: CPT | Performed by: FAMILY MEDICINE

## 2025-07-23 PROCEDURE — 3008F BODY MASS INDEX DOCD: CPT | Performed by: FAMILY MEDICINE

## 2025-07-23 PROCEDURE — 93000 ELECTROCARDIOGRAM COMPLETE: CPT | Performed by: FAMILY MEDICINE

## 2025-07-23 PROCEDURE — 99396 PREV VISIT EST AGE 40-64: CPT | Performed by: FAMILY MEDICINE

## 2025-07-23 RX ORDER — CIPROFLOXACIN 500 MG/1
500 TABLET, FILM COATED ORAL 2 TIMES DAILY
Qty: 20 TABLET | Refills: 0 | Status: SHIPPED | OUTPATIENT
Start: 2025-07-23 | End: 2025-08-02

## 2025-07-23 RX ORDER — ONDANSETRON 8 MG/1
8 TABLET, ORALLY DISINTEGRATING ORAL EVERY 8 HOURS PRN
Qty: 20 TABLET | Refills: 0 | Status: SHIPPED | OUTPATIENT
Start: 2025-07-23 | End: 2025-07-30

## 2025-07-23 ASSESSMENT — PATIENT HEALTH QUESTIONNAIRE - PHQ9
1. LITTLE INTEREST OR PLEASURE IN DOING THINGS: NOT AT ALL
2. FEELING DOWN, DEPRESSED OR HOPELESS: NOT AT ALL
SUM OF ALL RESPONSES TO PHQ9 QUESTIONS 1 AND 2: 0

## 2025-07-23 NOTE — PROGRESS NOTES
Annual Comprehensive Medical Exam    60 y.o. female presents for annual comprehensive medical evaluation and preventive services screening.  No recent hospitalizations, surgeries or significant injuries.    History of Present Illness    Gynecologist manages [facility] preventative [facility]  Colonoscopy completed in 2019.  Recommended repeat in 10 years.    Allergic rhinitis.  Patient requesting OMT    International travel - gets GI illness often.  Noro virus twice this year.      Hypothyroidism is managed by endocrinology.    Medical History[1]   Surgical History[2]  Family History[3]   Social History     Socioeconomic History    Marital status: Single     Spouse name: Not on file    Number of children: Not on file    Years of education: Not on file    Highest education level: Not on file   Occupational History    Not on file   Tobacco Use    Smoking status: Never     Passive exposure: Never    Smokeless tobacco: Never   Substance and Sexual Activity    Alcohol use: Not on file    Drug use: Not on file    Sexual activity: Not on file   Other Topics Concern    Not on file   Social History Narrative    Not on file     Social Drivers of Health     Financial Resource Strain: Low Risk  (4/7/2021)    Received from [facility]    Overall Financial Resource Strain (CARDIA)     Difficulty of Paying Living Expenses: Not hard at all   Food Insecurity: No Food Insecurity (4/7/2021)    Received from [facility]    Hunger Vital Sign     Within the past 12 months, you worried that your food would run out before you got the money to buy more.: Never true     Within the past 12 months, the food you bought just didn't last and you didn't have money to get more.: Never true   Transportation Needs: No Transportation Needs (4/7/2021)    Received from [facility]    PRAPARE - Transportation     Lack of Transportation (Medical): No     Lack of Transportation (Non-Medical): No  "  Physical Activity: Not on file   Stress: Not on file   Social Connections: Not on file   Intimate Partner Violence: Not on file   Housing Stability: Not on file       Medications Ordered Prior to Encounter[4]    Allergies[5]    Complete review of systems is negative today except for that mentioned in the history of present illness.  In particular patient denies chest pain, shortness of breath, headaches and GI disturbances.      Visit Vitals  /85 (BP Location: Left arm, Patient Position: Sitting)   Pulse 67   Ht 1.651 m (5' 5\")   Wt 69.4 kg (153 lb)   SpO2 99%   BMI 25.46 kg/m²   OB Status Postmenopausal   Smoking Status Never   BSA 1.78 m²      Vitals:    07/23/25 1541   BP: 137/85   BP Location: Left arm   Patient Position: Sitting   Pulse: 67   SpO2: 99%   Weight: 69.4 kg (153 lb)   Height: 1.651 m (5' 5\")     Physical Exam  Gen.: Alert and oriented ×3 female in no acute distress.  HEENT: Head is normocephalic.  Pupils equal and reactive to light.  Tympanic membranes are clear.  Pharynx is clear.  Neck is supple without adenopathy or carotid bruits.  No masses or thyromegaly  Heart: Regular rate and rhythm without murmurs.  Lungs: Clear to auscultation bilaterally.  Abdomen: Soft with normal bowel sounds.  No masses or pain to palpation.  No bruits auscultated.  Extremities: Good range of motion of all joints.  No significant edema. Pedal pulses +1-2/4  Skin: No significant or irregular nevi visualized.  Neuro: No signs of focal neurologic deficit.  No tremor.  Speech and hearing are normal.  DTRs +3/4;  Muscle Strength +5/5.  Musculoskeletal: Spine with good ROM.  No scoliosis.  Leg lengths are equal.  Psych: normal affect.  No suicidal ideation.  Good judgement and insight.       Osteopathic manipulative therapy (OMT): Muscle energy technique and HVLA to the cervical, thoracic and lumbar spine, thoracic outlet.  Good results bilaterally.  Increased range of motion and less pain after " manipulation    Diagnosis/Plan  1. Healthcare maintenance (Primary)  - Comprehensive metabolic panel; Future  - CBC; Future  - Lipid panel; Future  - TSH with reflex to Free T4 if abnormal; Future  - Urinalysis with Reflex Microscopic; Future  - Comprehensive metabolic panel  - CBC  - Lipid panel  - Urinalysis with Reflex Microscopic  - ECG 12 lead (Clinic Performed)    2. Vitamin D deficiency  - Vitamin D 25-Hydroxy,Total (for eval of Vitamin D levels); Future  - Vitamin D 25-Hydroxy,Total (for eval of Vitamin D levels)    3. Polyarthritis  - C-Reactive Protein; Future  - Sedimentation Rate; Future  - High sensitivity CRP; Future  - C-Reactive Protein  - Sedimentation Rate  - High sensitivity CRP    4. Nausea  - ondansetron ODT (Zofran-ODT) 8 mg disintegrating tablet; Dissolve 1 tablet (8 mg) in the mouth every 8 hours if needed for nausea or vomiting for up to 7 days.  Dispense: 20 tablet; Refill: 0    5. Traveler's diarrhea  Will provide prophylactic Cipro and Zofran.  - ciprofloxacin (Cipro) 500 mg tablet; Take 1 tablet (500 mg) by mouth 2 times a day for 10 days.  Dispense: 20 tablet; Refill: 0    6. Hypothyroidism, unspecified type  Continue management by endocrinology        Return to office in 12 months for comprehensive medical evaluation, long-term medication use monitoring, and preventative services screening    We will continue to monitor, evaluate, assess and treat all problems/diagnoses as appropriate and continue to collaborate with specialists.    Encouraged to sign up with [facility]    Contact office or send a  [employer] message with any questions or concerns    Patient will only be notified of labs that require medical intervention.    Prescriptions will not be filled unless you are compliant with your follow up appointments or have a follow up appointment scheduled as per instruction of your physician. Refills should be requested at the time of your visit.    **Charting was completed using voice  recognition technology and may include unintended errors**    Keith Dhaliwal DO, COURTNEY  [address] [id number]  [place] [address]  [phone]        Keith Dhaliwal DO, FACOFP           [1]   Past Medical History:  Diagnosis Date    COVID-19 06/01/2022    COVID-19 virus infection    Other specified enthesopathies of right lower limb, excluding foot 07/17/2017    Tendinitis of right hip flexor    Personal history of other diseases of the digestive system 09/22/2018    History of hemorrhoids    Personal history of other infectious and parasitic diseases     History of herpes labialis   [2]   Past Surgical History:  Procedure Laterality Date    OTHER SURGICAL HISTORY  09/04/2019    Liver lobectomy    OTHER SURGICAL HISTORY  06/01/2022    Cholecystectomy   [3]   Family History  Problem Relation Name Age of Onset    Hypertension Mother      Breast cancer Mother      Lung cancer Father      Breast cancer Father's Sister     [4]   Current Outpatient Medications on File Prior to Visit   Medication Sig Dispense Refill    fexofenadine-pseudoephedrine (Allegra-D)  mg 12 hr tablet Take 1 tablet by mouth every 12 hours.      levothyroxine (Synthroid, Levoxyl) 125 mcg tablet Take by mouth.      azelastine (Astelin) 137 mcg (0.1 %) nasal spray Administer 1 spray into each nostril 2 times a day. Use in each nostril as directed (Patient not taking: Reported on 7/23/2025) 30 mL 0    diphenhydrAMINE (BENADryl) 25 mg capsule Take by mouth. (Patient not taking: Reported on 7/23/2025)      fluticasone (Flonase) 50 mcg/actuation nasal spray Administer into affected nostril(s). (Patient not taking: Reported on 7/23/2025)      meclizine (Antivert) 25 mg tablet Take 1 tablet (25 mg) by mouth every 6 hours if needed for dizziness. (Patient not taking: Reported on 7/23/2025) 40 tablet 0     No current facility-administered medications on file prior to visit.   [5]   Allergies  Allergen Reactions    Sulfa (Sulfonamide Antibiotics)  Anaphylaxis and Swelling

## 2026-07-24 ENCOUNTER — APPOINTMENT (OUTPATIENT)
Dept: PRIMARY CARE | Facility: CLINIC | Age: 61
End: 2026-07-24
Payer: COMMERCIAL